# Patient Record
Sex: FEMALE
[De-identification: names, ages, dates, MRNs, and addresses within clinical notes are randomized per-mention and may not be internally consistent; named-entity substitution may affect disease eponyms.]

---

## 2021-01-14 ENCOUNTER — TRANSCRIPTION ENCOUNTER (OUTPATIENT)
Age: 76
End: 2021-01-14

## 2021-08-20 ENCOUNTER — TRANSCRIPTION ENCOUNTER (OUTPATIENT)
Age: 76
End: 2021-08-20

## 2021-11-10 PROBLEM — Z00.00 ENCOUNTER FOR PREVENTIVE HEALTH EXAMINATION: Status: ACTIVE | Noted: 2021-11-10

## 2021-11-17 ENCOUNTER — APPOINTMENT (OUTPATIENT)
Dept: OTOLARYNGOLOGY | Facility: CLINIC | Age: 76
End: 2021-11-17
Payer: MEDICARE

## 2021-11-17 VITALS
HEIGHT: 66.5 IN | HEART RATE: 61 BPM | BODY MASS INDEX: 22.08 KG/M2 | SYSTOLIC BLOOD PRESSURE: 132 MMHG | WEIGHT: 139 LBS | DIASTOLIC BLOOD PRESSURE: 72 MMHG | TEMPERATURE: 97.9 F

## 2021-11-17 DIAGNOSIS — Z78.9 OTHER SPECIFIED HEALTH STATUS: ICD-10-CM

## 2021-11-17 PROCEDURE — 99213 OFFICE O/P EST LOW 20 MIN: CPT | Mod: 25

## 2021-11-17 PROCEDURE — 69210 REMOVE IMPACTED EAR WAX UNI: CPT

## 2021-11-17 RX ORDER — METOPROLOL TARTRATE 25 MG/1
25 TABLET, FILM COATED ORAL
Refills: 0 | Status: ACTIVE | COMMUNITY

## 2021-11-19 NOTE — END OF VISIT
[FreeTextEntry3] : I saw and examined this patient in person. I have discussed with Lexus Harman, Physician Assistant, in detail the above note and agree with the above assessment and plan of care.\par

## 2021-11-19 NOTE — CONSULT LETTER
[Dear  ___] : Dear  [unfilled], [Consult Letter:] : I had the pleasure of evaluating your patient, [unfilled]. [Please see my note below.] : Please see my note below. [Consult Closing:] : Thank you very much for allowing me to participate in the care of this patient.  If you have any questions, please do not hesitate to contact me. [Sincerely,] : Sincerely, [FreeTextEntry3] : Mila Duarte MD\par Otolaryngology \par [facility]

## 2021-11-19 NOTE — ASSESSMENT
[FreeTextEntry1] : 75 yr old otherwise healthy female here to establish ENT care for ear cleaning, as her ENT retired.\par \par Cerumen\par -continue to follow up twice a year for ear cleaning\par \par hearing\par - she gets audiology evaluations at an outside audiology center; she denies changes and declines a hearing test today\par - she will send us a copy of her most recent audiology evaluation for her chart

## 2021-11-19 NOTE — HISTORY OF PRESENT ILLNESS
[de-identified] : Patient has been seen by another ENT twice a year for many years for ear cleanings. Her other ENT has retired so she is here today to have her ears cleaned. \par \par Patient does not have any dizziness but does have a constant ringing in both ears that she uses a sound machine at night for the last year or so.  She does not have any issues with her hearing and has had hearing evaluations with an audiologist elsewhere\par \par She denies nasal congestion or runny nose .

## 2022-04-01 ENCOUNTER — APPOINTMENT (OUTPATIENT)
Dept: OTOLARYNGOLOGY | Facility: CLINIC | Age: 77
End: 2022-04-01
Payer: MEDICARE

## 2022-04-01 VITALS
WEIGHT: 139 LBS | BODY MASS INDEX: 22.08 KG/M2 | DIASTOLIC BLOOD PRESSURE: 76 MMHG | HEIGHT: 66.5 IN | SYSTOLIC BLOOD PRESSURE: 129 MMHG | HEART RATE: 66 BPM

## 2022-04-01 PROCEDURE — 99213 OFFICE O/P EST LOW 20 MIN: CPT | Mod: 25

## 2022-04-01 PROCEDURE — 69210 REMOVE IMPACTED EAR WAX UNI: CPT

## 2022-04-01 NOTE — PHYSICAL EXAM
[Normal] : mucosa is normal [Midline] : trachea located in midline position [de-identified] : angel

## 2022-04-01 NOTE — ASSESSMENT
[FreeTextEntry1] : Ear fullness with Cerumen:\par - Cerumen removed today\par - Feels hearing is okay, declined audiogram today\par - F/U in 6 months\par \par \par I personally saw and examined Ms. FERNANDA RANOLD in detail this visit today. I personally reviewed the HPI, PMH, FH, SH, ROS and medications/allergies. I have spoken to MARGARITA Zavala regarding the history and have personally determined the assessment and plan of care, and documented this myself. I was present and participated in all key portions of the encounter and all procedures noted above. I have made changes in the body of the note where appropriate.\par \par Attesting Faculty: See Attending Signature Below

## 2022-04-01 NOTE — HISTORY OF PRESENT ILLNESS
[de-identified] : 75 y/o F with ear fullness.  No pain, no d/c.  Feels hearing is okay, mild tinnitus, not bothersome.  No Vertigo.   Has Hx of cerumen impaction and gets cerumen removed every 6 months.

## 2022-10-07 ENCOUNTER — APPOINTMENT (OUTPATIENT)
Dept: OTOLARYNGOLOGY | Facility: CLINIC | Age: 77
End: 2022-10-07

## 2022-10-07 VITALS
HEART RATE: 62 BPM | BODY MASS INDEX: 21.82 KG/M2 | SYSTOLIC BLOOD PRESSURE: 140 MMHG | TEMPERATURE: 97.5 F | DIASTOLIC BLOOD PRESSURE: 76 MMHG | WEIGHT: 139 LBS | HEIGHT: 67 IN

## 2022-10-07 PROCEDURE — 99213 OFFICE O/P EST LOW 20 MIN: CPT | Mod: 25

## 2022-10-07 PROCEDURE — 69210 REMOVE IMPACTED EAR WAX UNI: CPT

## 2022-10-07 NOTE — ASSESSMENT
[FreeTextEntry1] : Ear fullness due to Cerumen:\par - Cerumen Removed in office today\par - F/U 6 months\par \par \par \par \par

## 2022-10-07 NOTE — HISTORY OF PRESENT ILLNESS
[de-identified] : 77 y/o F with ear fullness.  No pain or d/c.  Feels hearing is stable.  No pain.  Hx of tinnitus that has not changed.

## 2022-10-07 NOTE — END OF VISIT
[FreeTextEntry3] : I personally saw and examined Ms. FERNANDA ARNOLD in detail this visit today. I personally reviewed the HPI, PMH, FH, SH, ROS and medications/allergies. I have spoken to MARGARITA Zavala regarding the history and have personally determined the assessment and plan of care, and documented this myself. I was present and participated in all key portions of the encounter and all procedures noted above. I have made changes in the body of the note where appropriate.\par \par Attesting Faculty: See Attending Signature Below

## 2022-10-07 NOTE — PHYSICAL EXAM
[Normal] : mucosa is normal [Midline] : trachea located in midline position [de-identified] : b/l severinon

## 2023-05-17 ENCOUNTER — APPOINTMENT (OUTPATIENT)
Dept: OTOLARYNGOLOGY | Facility: CLINIC | Age: 78
End: 2023-05-17
Payer: MEDICARE

## 2023-05-17 VITALS
HEIGHT: 67 IN | DIASTOLIC BLOOD PRESSURE: 77 MMHG | HEART RATE: 66 BPM | WEIGHT: 135 LBS | BODY MASS INDEX: 21.19 KG/M2 | SYSTOLIC BLOOD PRESSURE: 125 MMHG | TEMPERATURE: 97.6 F

## 2023-05-17 DIAGNOSIS — R04.0 EPISTAXIS: ICD-10-CM

## 2023-05-17 PROCEDURE — 99213 OFFICE O/P EST LOW 20 MIN: CPT | Mod: 25

## 2023-05-17 PROCEDURE — 69210 REMOVE IMPACTED EAR WAX UNI: CPT

## 2023-05-17 PROCEDURE — 31238 NSL/SINS NDSC SRG NSL HEMRRG: CPT | Mod: LT

## 2023-05-17 NOTE — PROCEDURE
[FreeTextEntry3] : Procedure - Cerumen Removal. \par After informed verbal consent is obtained, cerumen is removed from the b/l ear canal with a suction and curette.  Normal appearing canal following removal.\par  [FreeTextEntry6] : Reason for nasal endoscopy: anterior rhinoscopy insufficient to account for symptoms.\par \par Flexible scope #2 was used. Left nasal passage with normal inferior, middle and superior turbinates. Nasal passage was patent with clear middle meatus and sphenoethmoid recess. No mucopurulence or polyps appreciated. Nasopharynx clear. Left anterior septum cauterized with silver nitrate and then bacitracin applied. Abdomen soft, non-tender and non-distended, no rebound, no guarding and no masses. no hepatosplenomegaly.

## 2023-05-17 NOTE — ASSESSMENT
[FreeTextEntry1] : Ear fullness due to Cerumen:\par - Cerumen Removed in office today\par - F/U 6 months\par \par Anterior epistaxis:\par - cauterized today to control bleeding\par - counseled on aggressive nasal hydration with saline spray, saline gel, vaseline or bacitracin bid and humidifier at night\par - avoid nasal trauma or aggressive nose-blowing\par - f/u if bleeding recurs \par \par \par

## 2023-05-17 NOTE — HISTORY OF PRESENT ILLNESS
[de-identified] : 75 y/o F with ear fullness.  No pain or d/c.  Feels hearing is stable.  No pain.  Hx of tinnitus that has not changed.\par \par Also notes that has been having 2am nosebleeds on the left. She went to [facility] Sat AM and thinks they cauterized it. She notes happened 3 times last week. No bleeding since cauterization. She is congested on the left and notes some clear dripping. She has loratadine and wanted to starts this but wanted to know if she should start now. She is not on any blood thinners.

## 2023-05-17 NOTE — PHYSICAL EXAM
[de-identified] : b/l severinon  [de-identified] : left anterior septum with cauterized area with granulation tissue and small oozing posteriorly [Normal] : mucosa is normal [Midline] : trachea located in midline position

## 2023-11-08 ENCOUNTER — APPOINTMENT (OUTPATIENT)
Dept: OTOLARYNGOLOGY | Facility: CLINIC | Age: 78
End: 2023-11-08
Payer: MEDICARE

## 2023-11-08 VITALS
HEART RATE: 62 BPM | HEIGHT: 67 IN | SYSTOLIC BLOOD PRESSURE: 136 MMHG | WEIGHT: 135 LBS | DIASTOLIC BLOOD PRESSURE: 81 MMHG | BODY MASS INDEX: 21.19 KG/M2

## 2023-11-08 PROCEDURE — 99213 OFFICE O/P EST LOW 20 MIN: CPT | Mod: 25

## 2023-11-08 PROCEDURE — 69210 REMOVE IMPACTED EAR WAX UNI: CPT

## 2024-06-05 ENCOUNTER — APPOINTMENT (OUTPATIENT)
Dept: OTOLARYNGOLOGY | Facility: CLINIC | Age: 79
End: 2024-06-05
Payer: MEDICARE

## 2024-06-05 VITALS
HEART RATE: 67 BPM | HEIGHT: 67 IN | SYSTOLIC BLOOD PRESSURE: 132 MMHG | WEIGHT: 135 LBS | BODY MASS INDEX: 21.19 KG/M2 | DIASTOLIC BLOOD PRESSURE: 75 MMHG

## 2024-06-05 DIAGNOSIS — H93.8X3 OTHER SPECIFIED DISORDERS OF EAR, BILATERAL: ICD-10-CM

## 2024-06-05 DIAGNOSIS — H61.23 IMPACTED CERUMEN, BILATERAL: ICD-10-CM

## 2024-06-05 PROCEDURE — 99213 OFFICE O/P EST LOW 20 MIN: CPT | Mod: 25

## 2024-06-05 PROCEDURE — 69210 REMOVE IMPACTED EAR WAX UNI: CPT

## 2024-06-05 NOTE — PROCEDURE
[FreeTextEntry3] : Cerumen impaction removed with curette. After removal of cerumen canal noted to be normal without edema, purulence or inflammation. Patient tolerated procedure well.

## 2024-06-05 NOTE — PHYSICAL EXAM
[Normal] : tympanic membranes are normal in both ears [de-identified] : b/l wax, left superior to tragus with small skin tag vs seborrheic keratosis

## 2024-06-05 NOTE — ASSESSMENT
[FreeTextEntry1] : 79y/o female w/ b/l excessive wax and skin tag on the outer left ear  Excessive wax -Wax removed b/l -Rec f/u with her Dermatologist for the left external ear lesion to see if needs biopsy -F/u in 6 months for routine ear cleaning and can check audio that that time

## 2024-06-05 NOTE — HISTORY OF PRESENT ILLNESS
[de-identified] : 77y/o female who is here for her routine ear cleaning. She will have popping noises at times but otherwise no pain, drainage or change in her hearing. She states she has a scab on her Left ear she noticed. She has not noticed any bleeding or pain with it. She has no other ENT complaints.

## 2024-06-05 NOTE — END OF VISIT
[FreeTextEntry3] : I personally saw and examined Ms. FERNANDA ARNOLD in detail this visit today. I personally reviewed the HPI, PMH, FH, SH, ROS and medications/allergies. I have spoken to MARGARITA Arora regarding the history and have personally determined the assessment and plan of care, and documented this myself. I was present and participated in all key portions of the encounter and all procedures noted above. I have made changes in the body of the note where appropriate.   Attesting Faculty: See Attending Signature Below

## 2024-09-23 ENCOUNTER — APPOINTMENT (OUTPATIENT)
Dept: OTOLARYNGOLOGY | Facility: CLINIC | Age: 79
End: 2024-09-23

## 2024-12-04 ENCOUNTER — APPOINTMENT (OUTPATIENT)
Dept: OTOLARYNGOLOGY | Facility: CLINIC | Age: 79
End: 2024-12-04
Payer: MEDICARE

## 2024-12-04 VITALS
WEIGHT: 130 LBS | BODY MASS INDEX: 20.36 KG/M2 | SYSTOLIC BLOOD PRESSURE: 125 MMHG | HEART RATE: 69 BPM | TEMPERATURE: 98 F | DIASTOLIC BLOOD PRESSURE: 74 MMHG

## 2024-12-04 DIAGNOSIS — H61.23 IMPACTED CERUMEN, BILATERAL: ICD-10-CM

## 2024-12-04 DIAGNOSIS — H93.8X3 OTHER SPECIFIED DISORDERS OF EAR, BILATERAL: ICD-10-CM

## 2024-12-04 PROCEDURE — 69210 REMOVE IMPACTED EAR WAX UNI: CPT

## 2024-12-04 PROCEDURE — 99213 OFFICE O/P EST LOW 20 MIN: CPT | Mod: 25

## 2024-12-29 ENCOUNTER — NON-APPOINTMENT (OUTPATIENT)
Age: 79
End: 2024-12-29

## 2025-06-11 ENCOUNTER — APPOINTMENT (OUTPATIENT)
Dept: OTOLARYNGOLOGY | Facility: CLINIC | Age: 80
End: 2025-06-11